# Patient Record
Sex: FEMALE | Race: WHITE | NOT HISPANIC OR LATINO | Employment: FULL TIME | URBAN - METROPOLITAN AREA
[De-identification: names, ages, dates, MRNs, and addresses within clinical notes are randomized per-mention and may not be internally consistent; named-entity substitution may affect disease eponyms.]

---

## 2024-09-24 ENCOUNTER — OFFICE VISIT (OUTPATIENT)
Dept: NEUROLOGY | Facility: MEDICAL CENTER | Age: 54
End: 2024-09-24
Attending: PSYCHIATRY & NEUROLOGY
Payer: COMMERCIAL

## 2024-09-24 VITALS
BODY MASS INDEX: 29.04 KG/M2 | HEART RATE: 65 BPM | SYSTOLIC BLOOD PRESSURE: 90 MMHG | WEIGHT: 170.1 LBS | DIASTOLIC BLOOD PRESSURE: 62 MMHG | OXYGEN SATURATION: 100 % | HEIGHT: 64 IN | TEMPERATURE: 98.5 F

## 2024-09-24 DIAGNOSIS — G70.00 MYASTHENIA GRAVIS (HCC): ICD-10-CM

## 2024-09-24 PROCEDURE — 99211 OFF/OP EST MAY X REQ PHY/QHP: CPT | Performed by: PSYCHIATRY & NEUROLOGY

## 2024-09-24 RX ORDER — ESTRADIOL 0.05 MG/D
1 PATCH, EXTENDED RELEASE TRANSDERMAL
COMMUNITY
Start: 2024-07-13

## 2024-09-24 RX ORDER — PROGESTERONE 100 MG/1
CAPSULE ORAL
COMMUNITY
Start: 2024-08-31

## 2024-09-24 RX ORDER — PYRIDOSTIGMINE BROMIDE 60 MG/1
60 TABLET ORAL 3 TIMES DAILY
Qty: 270 TABLET | Refills: 3 | Status: SHIPPED | OUTPATIENT
Start: 2024-09-24

## 2024-09-24 ASSESSMENT — ENCOUNTER SYMPTOMS
FALLS: 0
DOUBLE VISION: 0
MEMORY LOSS: 0
INSOMNIA: 0

## 2024-09-24 ASSESSMENT — PATIENT HEALTH QUESTIONNAIRE - PHQ9: CLINICAL INTERPRETATION OF PHQ2 SCORE: 0

## 2024-09-24 NOTE — ASSESSMENT & PLAN NOTE
She is doing well, both of us are pleased as to how she is doing.  She understands that the fluctuations in symptoms without actual disease progression does have to do with stress, hormonal fluctuations with being perimenopausal, etc.  She knows about the need for compliance with her medicine, not that her disease will necessarily relapse, but more symptomatic control and unpleasantness.  We will simply follow-up in 1 year.    Orders:    pyridostigmine (MESTINON) 60 MG Tab; Take 1 Tablet by mouth 3 times a day. Additional dose as needed

## 2024-09-24 NOTE — PROGRESS NOTES
"Subjective     Juliet Asif is a 54 y.o. female who presents for 1 year follow-up, with a history of generalized myasthenia gravis.     MONTY Chung states that she has done well over the last year.  Though there are days where she may fluctuate to a degree, at times related to new symptoms of likely being perimenopausal, or stress, she is doing well.  She has not had to take additional doses of Mestinon in the circumstances.  It is usually a slurring of speech or drooping of the face that lets her know.  There is no dyspnea, ptosis or diplopia in the circumstances.  There are brief and resolved back to baseline quickly.    The Mestinon regimen of 60 mg taken 3 times a day with about a 6-hour interval, still maintains her well.  If she waits or misses a dose she pays the price later on, if she has taken a dose and forgets, the overdosage lets her know with muscle cramps, diaphoresis, nausea, diarrhea, dizziness, etc.  This is rare.    Medical, surgical and family histories are reviewed, there are no new drug allergies.  The symptoms of perimenopause are quite significant.  Other than the Mestinon as above, she is also on Ambien 10 mg nightly as needed, Prometrium and Vivelle DOT.    Review of Systems   Constitutional:  Negative for malaise/fatigue.   Eyes:  Negative for double vision.   Musculoskeletal:  Negative for falls.   Psychiatric/Behavioral:  Negative for memory loss. The patient does not have insomnia.    All other systems reviewed and are negative.    Objective     BP 90/62 (BP Location: Left arm, Patient Position: Sitting, BP Cuff Size: Adult)   Pulse 65   Temp 36.9 °C (98.5 °F) (Temporal)   Ht 1.626 m (5' 4\")   Wt 77.2 kg (170 lb 1.6 oz)   SpO2 100%   BMI 29.20 kg/m²      Physical Exam    She appears in no acute distress.  As always, very pleasant in spirit and demeanor, quite cooperative.  Vital signs are stable.  There is no malar rash.  The neck is supple.  Cardiac evaluation is unremarkable.  " There is no lower extremity edema.     Neurological Exam    Fully oriented, there is no aphasia, apraxia, or inattention.    PERRLA/EOMI, there is no diplopia, visual fields are full to finger counting on confrontation bilaterally.  Facial movements are symmetric, lip apposition intact, voice quality is maintained with clarity and without hoarseness or hypophonia.  There is no dysarthria.  Tongue and jaw movements are intact.  Sensory exam is intact to light touch.  Shoulder shrug and head rotation are intact as is neck flexion and extension.    Musculoskeletal exam reveals normal tone without tremor or drift.  After 30 seconds of arm flapping, there is no fatigable weakness of the deltoid muscles.  Strength is intact otherwise at 5/5.  Reflexes are brisk and present at all points, including the ankles, there are no asymmetries, the toes are downgoing bilaterally.    She stands easily, gait is normal and station and stride length, armswing is symmetric.  Heel, toe, and tandem walking are all normal.  There is no appendicular dystaxia.  Fine motor control is intact in all 4 extremities.    Sensory exam is intact to vibration and temperature, Romberg is absent.    Her gMG ADL score is 0/28.    Assessment & Plan     Assessment & Plan  Myasthenia gravis (HCC)  She is doing well, both of us are pleased as to how she is doing.  She understands that the fluctuations in symptoms without actual disease progression does have to do with stress, hormonal fluctuations with being perimenopausal, etc.  She knows about the need for compliance with her medicine, not that her disease will necessarily relapse, but more symptomatic control and unpleasantness.  We will simply follow-up in 1 year.    Orders:    pyridostigmine (MESTINON) 60 MG Tab; Take 1 Tablet by mouth 3 times a day. Additional dose as needed    Time: 40 minutes in total spent on patient care including pre-charting, record review, discussion with healthcare staff and  documentation.  This includes face-to-face time for exam, review, discussion, as well as counseling and coordinating care.

## 2025-04-26 ENCOUNTER — OFFICE VISIT (OUTPATIENT)
Dept: URGENT CARE | Facility: CLINIC | Age: 55
End: 2025-04-26
Payer: COMMERCIAL

## 2025-04-26 VITALS
RESPIRATION RATE: 18 BRPM | SYSTOLIC BLOOD PRESSURE: 100 MMHG | BODY MASS INDEX: 29.91 KG/M2 | DIASTOLIC BLOOD PRESSURE: 60 MMHG | TEMPERATURE: 98.8 F | WEIGHT: 175.2 LBS | OXYGEN SATURATION: 99 % | HEART RATE: 63 BPM | HEIGHT: 64 IN

## 2025-04-26 DIAGNOSIS — J02.9 SORE THROAT: ICD-10-CM

## 2025-04-26 LAB — S PYO DNA SPEC NAA+PROBE: NOT DETECTED

## 2025-04-26 PROCEDURE — 3078F DIAST BP <80 MM HG: CPT | Performed by: FAMILY MEDICINE

## 2025-04-26 PROCEDURE — 87651 STREP A DNA AMP PROBE: CPT | Performed by: FAMILY MEDICINE

## 2025-04-26 PROCEDURE — 99213 OFFICE O/P EST LOW 20 MIN: CPT | Performed by: FAMILY MEDICINE

## 2025-04-26 PROCEDURE — 3074F SYST BP LT 130 MM HG: CPT | Performed by: FAMILY MEDICINE

## 2025-04-26 ASSESSMENT — ENCOUNTER SYMPTOMS
CARDIOVASCULAR NEGATIVE: 1
EYES NEGATIVE: 1
RESPIRATORY NEGATIVE: 1
CHILLS: 0
FEVER: 0
WEIGHT LOSS: 0
SORE THROAT: 1

## 2025-04-26 NOTE — PROGRESS NOTES
"Subjective:   Juliet Asif is a 55 y.o. female who presents for Pharyngitis (On tonsils, started on Thursday, X 3 days )      Pharyngitis   Pertinent negatives include no congestion.       Review of Systems   Constitutional:  Negative for chills, fever, malaise/fatigue and weight loss.   HENT:  Positive for sore throat. Negative for congestion.    Eyes: Negative.    Respiratory: Negative.     Cardiovascular: Negative.    Skin: Negative.        Medications, Allergies, and current problem list reviewed today in Epic.     Objective:     /60 (BP Location: Left arm, Patient Position: Sitting, BP Cuff Size: Adult)   Pulse 63   Temp 37.1 °C (98.8 °F) (Temporal)   Resp 18   Ht 1.626 m (5' 4\")   Wt 79.5 kg (175 lb 3.2 oz)   SpO2 99%     Physical Exam  Vitals and nursing note reviewed.   Constitutional:       Appearance: Normal appearance.   HENT:      Head: Normocephalic.      Right Ear: Tympanic membrane normal.      Left Ear: Tympanic membrane normal.      Nose: Nose normal.      Mouth/Throat:      Mouth: Mucous membranes are moist.      Pharynx: Oropharynx is clear. No posterior oropharyngeal erythema.   Eyes:      Pupils: Pupils are equal, round, and reactive to light.   Cardiovascular:      Rate and Rhythm: Normal rate and regular rhythm.      Pulses: Normal pulses.      Heart sounds: Normal heart sounds.   Pulmonary:      Effort: Pulmonary effort is normal.      Breath sounds: Normal breath sounds.   Abdominal:      General: Abdomen is flat. Bowel sounds are normal.      Palpations: Abdomen is soft.   Musculoskeletal:      Cervical back: Tenderness present.   Lymphadenopathy:      Cervical: No cervical adenopathy.   Neurological:      Mental Status: She is alert.         Assessment/Plan:     Diagnosis and associated orders:     1. Sore throat  POCT CEPHEID [employer] A STREP - PCR         Comments/MDM:              Differential diagnosis, natural history, supportive care, and indications for immediate " follow-up discussed.    Advised the patient to follow-up with the primary care physician for recheck, reevaluation, and consideration of further management.    Please note that this dictation was created using voice recognition software. I have made a reasonable attempt to correct obvious errors, but I expect that there are errors of grammar and possibly content that I did not discover before finalizing the note.    This note was electronically signed by Jaime Josue M.D.

## 2025-05-30 ENCOUNTER — OFFICE VISIT (OUTPATIENT)
Dept: NEUROLOGY | Facility: MEDICAL CENTER | Age: 55
End: 2025-05-30
Attending: PSYCHIATRY & NEUROLOGY
Payer: COMMERCIAL

## 2025-05-30 VITALS
BODY MASS INDEX: 29.73 KG/M2 | HEART RATE: 124 BPM | HEIGHT: 64 IN | DIASTOLIC BLOOD PRESSURE: 64 MMHG | WEIGHT: 174.16 LBS | TEMPERATURE: 98.1 F | SYSTOLIC BLOOD PRESSURE: 112 MMHG | OXYGEN SATURATION: 94 %

## 2025-05-30 DIAGNOSIS — G70.00 MYASTHENIA GRAVIS (HCC): Primary | ICD-10-CM

## 2025-05-30 PROCEDURE — 99212 OFFICE O/P EST SF 10 MIN: CPT | Performed by: PSYCHIATRY & NEUROLOGY

## 2025-05-30 ASSESSMENT — ENCOUNTER SYMPTOMS
SENSORY CHANGE: 0
DEPRESSION: 0
INSOMNIA: 0
MEMORY LOSS: 0
FALLS: 0

## 2025-05-30 ASSESSMENT — PATIENT HEALTH QUESTIONNAIRE - PHQ9: CLINICAL INTERPRETATION OF PHQ2 SCORE: 0

## 2025-05-30 NOTE — PROGRESS NOTES
"Subjective     Juliet Asif is a 55 y.o. female who presents for follow-up, with a history of generalized myasthenia gravis, who needs [facility] paperwork completed.     HPI    Since last seen, Juliet continues to do well.  She has not had a disease relapse or crisis in quite some time.  She is on Mestinon 60 mg 3 times a day given at a 6-hour interval.  There is no wearing-off, unless she is late with the dose, in which case her right leg becomes a bit heavier, the speech slurs and the facial droop with ptosis evolves.  Within short order after taking the dose, things improved.    Overall there is still a fluctuation with symptom severity depending on anxiety and stress levels, especially at work.  She still works full-time but there are days consistently where she can only go for 4-6 total hours at a time, and she may be so restricted upwards of 5 days in a week.  This has been an ongoing pattern for quite some time.    She tolerates the Mestinon without issue.    Medical, surgical and family histories are reviewed, there are no new drug allergies.  She is on Mestinon 60 mg 3 times daily, Vivelle DOT, Prometrium and has Ambien to use at bedtime.    Review of Systems   Constitutional:  Positive for malaise/fatigue.   Musculoskeletal:  Negative for falls.   Neurological:  Negative for sensory change.   Psychiatric/Behavioral:  Negative for depression and memory loss. The patient does not have insomnia.      Objective     /64 (BP Location: Left arm, Patient Position: Sitting, BP Cuff Size: Adult)   Pulse (!) 124   Temp 36.7 °C (98.1 °F) (Temporal)   Ht 1.626 m (5' 4\")   Wt 79 kg (174 lb 2.6 oz)   SpO2 94%   BMI 29.90 kg/m²      Physical Exam    She appears in no acute distress.  Vital signs are stable.  There is no malar rash or jaw claudication.  The neck is supple.  Cardiac evaluation is unremarkable.     Neurological Exam    Fully oriented, there is no aphasia.    PERRLA/EOMI, there is no ptosis, facial " movements are symmetric, lip apposition normal.  Jaw movements are intact, the tongue and uvula protrude midline, lateral tongue movements are also normal.  Shoulder shrug and head rotation are symmetric, neck flexion and extension are normal.  Sensory exam is intact to light touch.    Musculoskeletal exam reveals normal tone without tremor or drift.  Strength is intact after rest at 5/5, after 30 seconds of arm flapping, there is no fatigability of the deltoid muscles.  Reflex exam is deferred.    Repetitive movements with the hands, fingers and feet are normal with symmetric amplitude and frequencies.  There is no appendicular dystaxia.  She stands slowly but easily from the exam table without assistance of pushing off, stride length is maintained, she does not shuffle with either of the lower extremities.    Sensory exam is deferred.  Assessment & Plan  Myasthenia gravis (HCC)  She is doing well, she still has the chronic and recurrent issues that can limit extended intervals of time when she is active, her symptoms always sensitive to anxiety and stress.  [facility] paperwork is completed, essentially stating that she can work anywhere from 5-8 hours every day, anywhere from 1-5 days in a week.    We will continue her Mestinon, there is no reason to increase the dose at this time.  She is functioning well within constraints that her illness presents for her.  We will follow-up in 1 year.    Time: 30 minutes in total spent on patient care including pre-charting, record review, discussion with healthcare staff and documentation.  This includes face-to-face time for exam, review, discussion, as well as counseling and coordinating care okay yeah okay I guess that she will make pace is that is still there okay okay could you do me a monster favor just let me had positivity can give this to as well this is but it has that same patient thanks

## 2025-05-30 NOTE — ASSESSMENT & PLAN NOTE
She is doing well, she still has the chronic and recurrent issues that can limit extended intervals of time when she is active, her symptoms always sensitive to anxiety and stress.  [place] paperwork is completed, essentially stating that she can work anywhere from 5-8 hours every day, anywhere from 1-5 days in a week.    We will continue her Mestinon, there is no reason to increase the dose at this time.  She is functioning well within constraints that her illness presents for her.  We will follow-up in 1 year.    Time: 30 minutes in total spent on patient care including pre-charting, record review, discussion with healthcare staff and documentation.  This includes face-to-face time for exam, review, discussion, as well as counseling and coordinating care okay yeah okay I guess that she will make pace is that is still there okay okay could you do me a monster favor just let me had positivity can give this to as well this is but it has that same patient thanks